# Patient Record
Sex: MALE | Race: WHITE | URBAN - METROPOLITAN AREA
[De-identification: names, ages, dates, MRNs, and addresses within clinical notes are randomized per-mention and may not be internally consistent; named-entity substitution may affect disease eponyms.]

---

## 2018-12-06 ENCOUNTER — RADIANT APPOINTMENT (OUTPATIENT)
Dept: GENERAL RADIOLOGY | Facility: CLINIC | Age: 28
End: 2018-12-06
Attending: PREVENTIVE MEDICINE
Payer: COMMERCIAL

## 2018-12-06 ENCOUNTER — OFFICE VISIT (OUTPATIENT)
Dept: ORTHOPEDICS | Facility: CLINIC | Age: 28
End: 2018-12-06
Payer: COMMERCIAL

## 2018-12-06 DIAGNOSIS — M25.512 LEFT SHOULDER PAIN: ICD-10-CM

## 2018-12-06 DIAGNOSIS — M75.82 ROTATOR CUFF TENDINITIS, LEFT: ICD-10-CM

## 2018-12-06 DIAGNOSIS — M25.512 ACUTE PAIN OF LEFT SHOULDER: Primary | ICD-10-CM

## 2018-12-06 RX ORDER — DICLOFENAC SODIUM 75 MG/1
75 TABLET, DELAYED RELEASE ORAL 2 TIMES DAILY PRN
Qty: 30 TABLET | Refills: 1 | Status: SHIPPED | OUTPATIENT
Start: 2018-12-06

## 2018-12-06 NOTE — LETTER
12/6/2018       RE: Milan Fuentes  [address]  [place] [address]     Dear Colleague,    Thank you for referring your patient, Milan Fuentes, to the [facility] SPORTS AND ORTHOPAEDIC WALK IN CLINIC at [facility]. Please see a copy of my visit note below.          SPORTS & ORTHOPEDIC WALK-IN VISIT 12/6/2018    Primary Care Physician: None  2-3 wks ago while benching noticed dull pain. Next day went to do bicep curls, which had to stop d/t pain. Then went to cable raises, felt a shooting pain.    Reason for visit:     What part of your body is injured / painful?  left shoulder    What caused the injury /pain? Lifting    How long ago did your injury occur or pain begin? several weeks ago    What are your most bothersome symptoms? Pain    How would you characterize your symptom?  aching, dull and sharp    What makes your symptoms better? Rest, ice, NSAIDS    What makes your symptoms worse? Movement    Have you been previously seen for this problem? No    Medical History:    Any recent changes to your medical history? No    Any new medication prescribed since last visit? No    Have you had surgery on this body part before? No    Social History:    Occupation: Student at [employer]    Handedness: Right    Exercise: 4-5 days/week    Review of Systems:    Do you have fever, chills, weight loss? No    Do you have any vision problems? No    Do you have any chest pain or edema? No    Do you have any shortness of breath or wheezing?  No    Do you have stomach problems? No    Do you have any numbness or focal weakness? No    Do you have diabetes? No    Do you have problems with bleeding or clotting? No    Do you have an rashes or other skin lesions? No       Patient seen and examined and agree with above.   Dr Alberts    HISTORY OF PRESENT ILLNESS  Mr. Fuentes is a pleasant 28 year old year old right hand dominate male who presents to clinic today with left shoulder pain.     Milan  explains that roughly 3 weeks ago while lifting weights he developed left shoulder pain. He initially noticed some achy discomfort when performing his regular weight training routine. Patient was bench pressing when he first had some minor discomfort over the left shoulder and deltoid. Following this he attempted to perform lateral raises on a cable machine when he developed severe sharp pain in the left shoulder. He was unable to finish his exercise routine at that time due to pain. Patient abstained from any upper body exercises following this. During this time he took NSAIDs as well as applied ice to the shoulder. His discomfort had resolved until today when he attempted lateral raises. Discomfort returned. Denies any prior injuries to the shoulder. No numbness or tingling. No neck pain. 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       Location: Left shoulder   Quality:  achy pain    Severity: 5/10 at worst    Duration: 3 weeks   Timing: occurs with particular movements   Context: occurs while exercising and lifting (particularly lateral raises)  Modifying factors:  resting and non-use makes it better, movement and use makes it worse  Associated signs & symptoms: no numbness or tingling   Previous similar pain: no  Exercise: lifting weights and cardiovascular on machine  Additional history: as documented    MEDICAL HISTORY  There is no problem list on file for this patient.      No current outpatient prescriptions on file.       No Known Allergies    No family history on file.    Additional medical/Social/Surgical histories reviewed in [employer] and updated as appropriate.     REVIEW OF SYSTEMS (12/6/2018)  10 point ROS of systems including Constitutional, Eyes, Respiratory, Cardiovascular, Gastroenterology, Genitourinary, Integumentary, Musculoskeletal, Psychiatric were all negative except for pertinent positives noted in my  HPI.     PHYSICAL EXAM  There were no vitals filed for this visit.  Vital Signs: There were no vitals taken for this visit. Patient declined being weighed. There is no height or weight on file to calculate BMI.    General  - normal appearance, in no obvious distress  CV  - normal radial pulse  Pulm  - normal respiratory pattern, non-labored  Musculoskeletal - shoulder  - inspection: normal bone and joint alignment, no obvious deformity, no scapular winging, no AC step-off  - palpation: mildly tender RC insertion, tender over the trapezius musculature on the left.  normal clavicle, non-tender AC  - ROM:  painful and limited ER.  Full IR, flexion, extension, abduction.   - strength: 5/5  strength, 5/5 in all shoulder planes  - special tests:  (+) Speed's  (+) Neer  (+) Hawkin's  (+) Laura's  (-) Deuel's  (+) apprehension  (-) subscap lift-off  Neuro  - no sensory or motor deficit, grossly normal coordination, normal muscle tone  Skin  - no ecchymosis, erythema, warmth, or induration, no obvious rash  Psych  - interactive, appropriate, normal mood and affect  ASSESSMENT & PLAN  Milan Fuentes is a 28 year old male who presents with left shoulder pain consistent with rotator cuff tendonitis.     -Imaging obtained today reviewed with patient  -Prescription for Diclofenac 75 mg provided   -HEP instructions given   -Discussed abstaining from painful upper body exercises for one month   -Follow up if no improvement or worsening symptoms       Markus Alberts MD, [facility]    Again, thank you for allowing me to participate in the care of your patient.      Sincerely,    Markus Alberts MD

## 2018-12-06 NOTE — LETTER
Date:January 8, 2019      Patient was self referred, no letter generated. Do not send.        [facility] Information

## 2018-12-06 NOTE — PROGRESS NOTES
HISTORY OF PRESENT ILLNESS  Mr. Fuentes is a pleasant 28 year old year old right hand dominate male who presents to clinic today with left shoulder pain.     Milan explains that roughly 3 weeks ago while lifting weights he developed left shoulder pain. He initially noticed some achy discomfort when performing his regular weight training routine. Patient was bench pressing when he first had some minor discomfort over the left shoulder and deltoid. Following this he attempted to perform lateral raises on a cable machine when he developed severe sharp pain in the left shoulder. He was unable to finish his exercise routine at that time due to pain. Patient abstained from any upper body exercises following this. During this time he took NSAIDs as well as applied ice to the shoulder. His discomfort had resolved until today when he attempted lateral raises. Discomfort returned. Denies any prior injuries to the shoulder. No numbness or tingling. No neck pain. 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       Location: Left shoulder   Quality:  achy pain    Severity: 5/10 at worst    Duration: 3 weeks   Timing: occurs with particular movements   Context: occurs while exercising and lifting (particularly lateral raises)  Modifying factors:  resting and non-use makes it better, movement and use makes it worse  Associated signs & symptoms: no numbness or tingling   Previous similar pain: no  Exercise: lifting weights and cardiovascular on machine  Additional history: as documented    MEDICAL HISTORY  There is no problem list on file for this patient.      No current outpatient prescriptions on file.       No Known Allergies    No family history on file.    Additional medical/Social/Surgical histories reviewed in [employer] and updated as appropriate.     REVIEW OF SYSTEMS (12/6/2018)  10 point ROS of systems including Constitutional, Eyes,  Respiratory, Cardiovascular, Gastroenterology, Genitourinary, Integumentary, Musculoskeletal, Psychiatric were all negative except for pertinent positives noted in my HPI.     PHYSICAL EXAM  There were no vitals filed for this visit.  Vital Signs: There were no vitals taken for this visit. Patient declined being weighed. There is no height or weight on file to calculate BMI.    General  - normal appearance, in no obvious distress  CV  - normal radial pulse  Pulm  - normal respiratory pattern, non-labored  Musculoskeletal - shoulder  - inspection: normal bone and joint alignment, no obvious deformity, no scapular winging, no AC step-off  - palpation: mildly tender RC insertion, tender over the trapezius musculature on the left.  normal clavicle, non-tender AC  - ROM:  painful and limited ER.  Full IR, flexion, extension, abduction.   - strength: 5/5  strength, 5/5 in all shoulder planes  - special tests:  (+) Speed's  (+) Neer  (+) Hawkin's  (+) Laura's  (-) [place]'s  (+) apprehension  (-) subscap lift-off  Neuro  - no sensory or motor deficit, grossly normal coordination, normal muscle tone  Skin  - no ecchymosis, erythema, warmth, or induration, no obvious rash  Psych  - interactive, appropriate, normal mood and affect  ASSESSMENT & PLAN  Milan Fuentes is a 28 year old male who presents with left shoulder pain consistent with rotator cuff tendonitis.     -Imaging obtained today reviewed with patient  -Prescription for Diclofenac 75 mg provided   -HEP instructions given   -Discussed abstaining from painful upper body exercises for one month   -Follow up if no improvement or worsening symptoms       Markus Alberts MD, CAQSM

## 2018-12-06 NOTE — PROGRESS NOTES
SPORTS & ORTHOPEDIC WALK-IN VISIT 12/6/2018    Primary Care Physician: None  2-3 wks ago while benching noticed dull pain. Next day went to do bicep curls, which had to stop d/t pain. Then went to cable raises, felt a shooting pain.    Reason for visit:     What part of your body is injured / painful?  left shoulder    What caused the injury /pain? Lifting    How long ago did your injury occur or pain begin? several weeks ago    What are your most bothersome symptoms? Pain    How would you characterize your symptom?  aching, dull and sharp    What makes your symptoms better? Rest, ice, NSAIDS    What makes your symptoms worse? Movement    Have you been previously seen for this problem? No    Medical History:    Any recent changes to your medical history? No    Any new medication prescribed since last visit? No    Have you had surgery on this body part before? No    Social History:    Occupation: Student at [employer]    Handedness: Right    Exercise: 4-5 days/week    Review of Systems:    Do you have fever, chills, weight loss? No    Do you have any vision problems? No    Do you have any chest pain or edema? No    Do you have any shortness of breath or wheezing?  No    Do you have stomach problems? No    Do you have any numbness or focal weakness? No    Do you have diabetes? No    Do you have problems with bleeding or clotting? No    Do you have an rashes or other skin lesions? No       Patient seen and examined and agree with above.   Dr Alberts